# Patient Record
Sex: FEMALE | Race: ASIAN | NOT HISPANIC OR LATINO
[De-identification: names, ages, dates, MRNs, and addresses within clinical notes are randomized per-mention and may not be internally consistent; named-entity substitution may affect disease eponyms.]

---

## 2022-03-07 ENCOUNTER — APPOINTMENT (OUTPATIENT)
Dept: CT IMAGING | Facility: CLINIC | Age: 62
End: 2022-03-07
Payer: COMMERCIAL

## 2022-03-07 PROCEDURE — 74177 CT ABD & PELVIS W/CONTRAST: CPT

## 2022-03-07 PROCEDURE — 82565 ASSAY OF CREATININE: CPT | Mod: QW

## 2022-05-09 ENCOUNTER — APPOINTMENT (OUTPATIENT)
Dept: MRI IMAGING | Facility: CLINIC | Age: 62
End: 2022-05-09

## 2022-05-09 PROBLEM — Z00.00 ENCOUNTER FOR PREVENTIVE HEALTH EXAMINATION: Status: ACTIVE | Noted: 2022-05-09

## 2022-05-10 ENCOUNTER — APPOINTMENT (OUTPATIENT)
Dept: MRI IMAGING | Facility: CLINIC | Age: 62
End: 2022-05-10
Payer: COMMERCIAL

## 2022-05-10 PROCEDURE — A9585: CPT | Mod: JW

## 2022-05-10 PROCEDURE — 74183 MRI ABD W/O CNTR FLWD CNTR: CPT

## 2022-09-30 ENCOUNTER — NON-APPOINTMENT (OUTPATIENT)
Age: 62
End: 2022-09-30

## 2022-09-30 ENCOUNTER — APPOINTMENT (OUTPATIENT)
Dept: CARDIOLOGY | Facility: CLINIC | Age: 62
End: 2022-09-30

## 2022-09-30 VITALS
DIASTOLIC BLOOD PRESSURE: 76 MMHG | HEIGHT: 60 IN | TEMPERATURE: 97.1 F | SYSTOLIC BLOOD PRESSURE: 128 MMHG | HEART RATE: 78 BPM | BODY MASS INDEX: 23.36 KG/M2 | OXYGEN SATURATION: 98 % | WEIGHT: 119 LBS

## 2022-09-30 DIAGNOSIS — Z72.3 LACK OF PHYSICAL EXERCISE: ICD-10-CM

## 2022-09-30 DIAGNOSIS — Z82.49 FAMILY HISTORY OF ISCHEMIC HEART DISEASE AND OTHER DISEASES OF THE CIRCULATORY SYSTEM: ICD-10-CM

## 2022-09-30 DIAGNOSIS — Z78.9 OTHER SPECIFIED HEALTH STATUS: ICD-10-CM

## 2022-09-30 DIAGNOSIS — Z87.891 PERSONAL HISTORY OF NICOTINE DEPENDENCE: ICD-10-CM

## 2022-09-30 PROCEDURE — 99244 OFF/OP CNSLTJ NEW/EST MOD 40: CPT

## 2022-09-30 PROCEDURE — 93000 ELECTROCARDIOGRAM COMPLETE: CPT

## 2022-09-30 RX ORDER — MENTHOL/CAMPHOR 0.5 %-0.5%
1000 LOTION (ML) TOPICAL
Refills: 0 | Status: ACTIVE | COMMUNITY

## 2022-09-30 RX ORDER — CYANOCOBALAMIN (VITAMIN B-12) 100 MCG
100 TABLET ORAL DAILY
Refills: 0 | Status: ACTIVE | COMMUNITY

## 2022-09-30 RX ORDER — LISINOPRIL 10 MG/1
10 TABLET ORAL DAILY
Refills: 0 | Status: ACTIVE | COMMUNITY

## 2022-11-24 NOTE — HISTORY OF PRESENT ILLNESS
[FreeTextEntry1] : KATHERINE ORDOÑEZ  is a 62 year old  F\par \par \par Presents today for cardiovascular risk assessment\par \par History of hypertension.  Previously she had an ER visit for uncontrolled blood pressure.  \par At that time she had symptoms of chest discomfort and dizziness.  \par She is maintained on 10 mg of lisinopril.  \par There is a family history of hypertension and hyperlipidemia.  \par Recently had blood work had elevated cholesterol and prediabetes.  \par She has been actively working on lifestyle measures.  \par She lost weight.  She walks miles.  There is significant stress related to her work.  \par \par There is no prior history of a clinical myocardial infarction, coronary revascularization. \par There is no history of symptomatic congestive heart failure rheumatic heart disease or valvular disease.\par There is no history of symptomatic arrhythmias including atrial fibrillation.\par \par There is no exertional chest pain, pressure or discomfort. \par There is no significant dyspnea on exertion or orthopnea. \par There are no symptomatic palpitations, lightheadedness, dizziness or syncope.\par \par Today's EKG demonstrates normal sinus rhythm.  \par August 2022 total cholesterol 273, HDL 57, , creatinine 0.7, hemoglobin 13.5.  18-Jun-2019 13:33

## 2022-11-24 NOTE — ASSESSMENT
[FreeTextEntry1] : Recent blood work and EKG have been reviewed. \par Hypertension, hyperlipidemia, prediabetes.  \par Reviewed indications for lipid-lowering therapy.  \par Continue ACE inhibitor.  Monitor electrolytes and renal function.  No adverse effects.  Low-sodium diet.  \par Follow-up echocardiogram carotid Doppler and stress test.  \par Clinical follow-up will be scheduled after requested testing.\par

## 2022-11-24 NOTE — HISTORY OF PRESENT ILLNESS
[FreeTextEntry1] : KATHERINE ORDOÑEZ  is a 62 year old  F\par \par \par Presents today for cardiovascular risk assessment\par \par History of hypertension.  Previously she had an ER visit for uncontrolled blood pressure.  \par At that time she had symptoms of chest discomfort and dizziness.  \par She is maintained on 10 mg of lisinopril.  \par There is a family history of hypertension and hyperlipidemia.  \par Recently had blood work had elevated cholesterol and prediabetes.  \par She has been actively working on lifestyle measures.  \par She lost weight.  She walks miles.  There is significant stress related to her work.  \par \par There is no prior history of a clinical myocardial infarction, coronary revascularization. \par There is no history of symptomatic congestive heart failure rheumatic heart disease or valvular disease.\par There is no history of symptomatic arrhythmias including atrial fibrillation.\par \par There is no exertional chest pain, pressure or discomfort. \par There is no significant dyspnea on exertion or orthopnea. \par There are no symptomatic palpitations, lightheadedness, dizziness or syncope.\par \par Today's EKG demonstrates normal sinus rhythm.  \par August 2022 total cholesterol 273, HDL 57, , creatinine 0.7, hemoglobin 13.5.

## 2023-01-06 ENCOUNTER — APPOINTMENT (OUTPATIENT)
Dept: CARDIOLOGY | Facility: CLINIC | Age: 63
End: 2023-01-06
Payer: COMMERCIAL

## 2023-01-06 PROCEDURE — 93880 EXTRACRANIAL BILAT STUDY: CPT

## 2023-01-06 PROCEDURE — 93306 TTE W/DOPPLER COMPLETE: CPT

## 2023-01-06 PROCEDURE — 93015 CV STRESS TEST SUPVJ I&R: CPT

## 2023-01-25 ENCOUNTER — APPOINTMENT (OUTPATIENT)
Dept: CARDIOLOGY | Facility: CLINIC | Age: 63
End: 2023-01-25
Payer: COMMERCIAL

## 2023-01-25 PROCEDURE — 93320 DOPPLER ECHO COMPLETE: CPT

## 2023-01-25 PROCEDURE — 93351 STRESS TTE COMPLETE: CPT

## 2023-02-13 ENCOUNTER — APPOINTMENT (OUTPATIENT)
Dept: CARDIOLOGY | Facility: CLINIC | Age: 63
End: 2023-02-13
Payer: COMMERCIAL

## 2023-02-13 VITALS
HEIGHT: 60 IN | BODY MASS INDEX: 23.56 KG/M2 | SYSTOLIC BLOOD PRESSURE: 120 MMHG | OXYGEN SATURATION: 97 % | HEART RATE: 89 BPM | WEIGHT: 120 LBS | TEMPERATURE: 97.1 F | DIASTOLIC BLOOD PRESSURE: 62 MMHG

## 2023-02-13 DIAGNOSIS — R07.9 CHEST PAIN, UNSPECIFIED: ICD-10-CM

## 2023-02-13 DIAGNOSIS — R73.09 OTHER ABNORMAL GLUCOSE: ICD-10-CM

## 2023-02-13 DIAGNOSIS — R73.03 PREDIABETES.: ICD-10-CM

## 2023-02-13 DIAGNOSIS — R79.82 ELEVATED C-REACTIVE PROTEIN (CRP): ICD-10-CM

## 2023-02-13 DIAGNOSIS — I10 ESSENTIAL (PRIMARY) HYPERTENSION: ICD-10-CM

## 2023-02-13 DIAGNOSIS — R42 DIZZINESS AND GIDDINESS: ICD-10-CM

## 2023-02-13 DIAGNOSIS — R94.39 ABNORMAL RESULT OF OTHER CARDIOVASCULAR FUNCTION STUDY: ICD-10-CM

## 2023-02-13 DIAGNOSIS — E78.5 HYPERLIPIDEMIA, UNSPECIFIED: ICD-10-CM

## 2023-02-13 DIAGNOSIS — R79.89 OTHER SPECIFIED ABNORMAL FINDINGS OF BLOOD CHEMISTRY: ICD-10-CM

## 2023-02-13 PROCEDURE — 99214 OFFICE O/P EST MOD 30 MIN: CPT

## 2023-02-13 RX ORDER — COLD-HOT PACK
EACH MISCELLANEOUS
Refills: 0 | Status: ACTIVE | COMMUNITY

## 2023-02-13 NOTE — HISTORY OF PRESENT ILLNESS
[FreeTextEntry1] : KATHERINE ORDOÑEZ  is a 62 year old  F\par \par History of hypertension.  Previously she had an ER visit for uncontrolled blood pressure.  \par At that time she had symptoms of chest discomfort and dizziness.  \par She is maintained on 10 mg of lisinopril.  \par There is a family history of hypertension and hyperlipidemia.  \par Recently had blood work had elevated cholesterol and prediabetes.  \par She has been actively working on lifestyle measures.  \par She lost weight.  She walks miles.  There is significant stress related to her work.  \par \par There is no prior history of a clinical myocardial infarction, coronary revascularization. \par There is no history of symptomatic congestive heart failure rheumatic heart disease or valvular disease.\par There is no history of symptomatic arrhythmias including atrial fibrillation.\par \par There is no exertional chest pain, pressure or discomfort. \par There is no significant dyspnea on exertion or orthopnea. \par There are no symptomatic palpitations, lightheadedness, dizziness or syncope.\par \par EKG demonstrates normal sinus rhythm.  \par August 2022 total cholesterol 273, HDL 57, , creatinine 0.7, hemoglobin 13.5. \par Labs February 2023 K4.2, creatinine 0.5, total cholesterol 242, , A1c 5.7, lipoprotein a/CK WNL, CRP 5.3\par 10-year ASCVD 4.46%\par \par Exercise treadmill test January 2023 duration 10 minutes 44 seconds normal CV response dizziness noted at peak corresponding to 146/90 blood pressure.  No chest pain but there was greater than 1 mm exaggeration of baseline ST abnormality suggestive of ischemia\par Echo January 2023 EF 60 to 65%, trace valvular heart disease normal pericardium\par Stress echocardiogram January 2023 advanced Michael protocol immediately post-rest there were no regional wall motion abnormalities therefore no evidence of ischemic heart disease\par Carotid Doppler January 2023 intimal thickening no stenosis

## 2023-02-13 NOTE — ASSESSMENT
[FreeTextEntry1] : KATHERINE ORDOÑEZ is a 62 year old F who presents today Feb 13, 2023 with the above history and the following active issues:\par \par Abnl ETT\par No ischemic heart disease on stress echo \par normal LV systolic function \par Risk factor modification reviewed, detailed below\par \par HTN\par well controlled\par Continue ACE inhibitor.  Monitor electrolytes and renal function.  No adverse effects.  Low-sodium diet.  \par \par HLD\par Reviewed indications for lipid-lowering therapy.  \par Intermediate ASCVD risk + elevated CRP + prediabetes\par normal Lp(a)\par lipids improved with dietary changes\par rec calcium score and will add statin if > zero, call w results \par \par Mediterranean diet and regular cardiovascular exercise counseling done. \par Any questions and concerns were addressed and resolved. \par \par Sincerely,\par \par ALVIN Diana\par Supervising MD: Dr. Duy Bingham

## 2023-12-27 LAB — HBA1C MFR BLD HPLC: 5.8

## 2024-01-19 ENCOUNTER — APPOINTMENT (OUTPATIENT)
Dept: CARDIOLOGY | Facility: CLINIC | Age: 64
End: 2024-01-19